# Patient Record
Sex: FEMALE | Race: ASIAN | NOT HISPANIC OR LATINO | Employment: UNEMPLOYED | URBAN - METROPOLITAN AREA
[De-identification: names, ages, dates, MRNs, and addresses within clinical notes are randomized per-mention and may not be internally consistent; named-entity substitution may affect disease eponyms.]

---

## 2020-10-27 ENCOUNTER — TRANSCRIBE ORDERS (OUTPATIENT)
Dept: LAB | Facility: HOSPITAL | Age: 35
End: 2020-10-27

## 2020-10-27 ENCOUNTER — LAB (OUTPATIENT)
Dept: LAB | Facility: HOSPITAL | Age: 35
End: 2020-10-27
Payer: COMMERCIAL

## 2020-10-27 DIAGNOSIS — R76.11 NONSPECIFIC REACTION TO TUBERCULIN TEST: ICD-10-CM

## 2020-10-27 DIAGNOSIS — R76.11 NONSPECIFIC REACTION TO TUBERCULIN TEST: Primary | ICD-10-CM

## 2020-10-27 PROCEDURE — 86480 TB TEST CELL IMMUN MEASURE: CPT

## 2020-10-27 PROCEDURE — 87591 N.GONORRHOEAE DNA AMP PROB: CPT

## 2020-10-27 PROCEDURE — 87491 CHLMYD TRACH DNA AMP PROBE: CPT

## 2020-10-27 PROCEDURE — 86592 SYPHILIS TEST NON-TREP QUAL: CPT

## 2020-10-27 PROCEDURE — 36415 COLL VENOUS BLD VENIPUNCTURE: CPT

## 2020-10-28 LAB
C TRACH DNA SPEC QL NAA+PROBE: NEGATIVE
N GONORRHOEA DNA SPEC QL NAA+PROBE: NEGATIVE
RPR SER QL: NORMAL

## 2020-10-29 LAB
GAMMA INTERFERON BACKGROUND BLD IA-ACNC: 0.03 IU/ML
M TB IFN-G BLD-IMP: NEGATIVE
M TB IFN-G CD4+ BCKGRND COR BLD-ACNC: 0.07 IU/ML
M TB IFN-G CD4+ BCKGRND COR BLD-ACNC: 0.08 IU/ML
MITOGEN IGNF BCKGRD COR BLD-ACNC: >10 IU/ML

## 2021-01-13 ENCOUNTER — ANNUAL EXAM (OUTPATIENT)
Dept: OBGYN CLINIC | Facility: CLINIC | Age: 36
End: 2021-01-13
Payer: COMMERCIAL

## 2021-01-13 VITALS
HEIGHT: 67 IN | SYSTOLIC BLOOD PRESSURE: 110 MMHG | DIASTOLIC BLOOD PRESSURE: 70 MMHG | WEIGHT: 145 LBS | BODY MASS INDEX: 22.76 KG/M2

## 2021-01-13 DIAGNOSIS — Z23 NEED FOR HPV VACCINE: ICD-10-CM

## 2021-01-13 DIAGNOSIS — Z30.09 ENCOUNTER FOR COUNSELING REGARDING CONTRACEPTION: ICD-10-CM

## 2021-01-13 DIAGNOSIS — N88.2 CERVICAL STENOSIS (UTERINE CERVIX): ICD-10-CM

## 2021-01-13 DIAGNOSIS — Z01.419 WOMEN'S ANNUAL ROUTINE GYNECOLOGICAL EXAMINATION: Primary | ICD-10-CM

## 2021-01-13 PROCEDURE — G0124 SCREEN C/V THIN LAYER BY MD: HCPCS | Performed by: PATHOLOGY

## 2021-01-13 PROCEDURE — 90651 9VHPV VACCINE 2/3 DOSE IM: CPT | Performed by: NURSE PRACTITIONER

## 2021-01-13 PROCEDURE — G0145 SCR C/V CYTO,THINLAYER,RESCR: HCPCS | Performed by: PATHOLOGY

## 2021-01-13 PROCEDURE — 87624 HPV HI-RISK TYP POOLED RSLT: CPT | Performed by: NURSE PRACTITIONER

## 2021-01-13 PROCEDURE — S0612 ANNUAL GYNECOLOGICAL EXAMINA: HCPCS | Performed by: NURSE PRACTITIONER

## 2021-01-13 PROCEDURE — 90471 IMMUNIZATION ADMIN: CPT | Performed by: NURSE PRACTITIONER

## 2021-01-13 RX ORDER — MISOPROSTOL 200 UG/1
TABLET ORAL
Qty: 3 TABLET | Refills: 0 | Status: SHIPPED | OUTPATIENT
Start: 2021-01-13 | End: 2021-06-09 | Stop reason: ALTCHOICE

## 2021-01-13 NOTE — PROGRESS NOTES
Subjective    HPI:     Jg Delaney is a 28 y o  female  She is a [place] 2, Para 0  Her menstrual cycles are regular and predictable, 28 days apart lasting 4-5 days  Her current method of contraception includes withdrawal  She is interest initiating birth control  Options of OCP and IUD reviewed  She is opted for a Mirena  She denies issues with intimacy  She denies /GI and Gyn complaints  She has a abnormal pap with ASCUS + HPV in 2019  A colposcopy was done and results were benign reactive squamous mucosa  No HPV infection or dysplasia  She feels safe at home  She complains of situational depression/anxiety  Medical, surgical and family history reviewed  Mother had uterine cancer and father had tongue cancer  Her dental care is up-to-date  She eats a healthy diet and exercises regularly  Gynecologic History    Patient's last menstrual period was 2021  Last Pap: 2019 at the [facility]  Results were ASCUS with + HPV  She started the HPV vaccine in 2019 received one dose  Needs to complete series  Obstetric History    OB History    Para Term  AB Living   2       2     SAB TAB Ectopic Multiple Live Births     2            # Outcome Date GA Lbr Bennett/2nd Weight Sex Delivery Anes PTL Lv   2 TAB            1 TAB                The following portions of the patient's history were reviewed and updated as appropriate: allergies, current medications, past family history, past medical history, past social history, past surgical history and problem list     Review of Systems    Pertinent items are noted in HPI  Objective    Physical Exam  Constitutional:       Appearance: She is well-developed  Genitourinary:      Pelvic exam was performed with patient in the lithotomy position  Vulva, inguinal canal, urethra, bladder, vagina, uterus, right adnexa and left adnexa normal       No posterior fourchette tenderness, injury, rash or lesion present        Cervix is nulliparous  No cervical motion tenderness, discharge, friability, lesion, erythema, bleeding, polyp or nabothian cyst       Uterus is anteverted  No right or left adnexal mass present  Right adnexa not tender or full  Left adnexa not tender or full  HENT:      Head: Normocephalic and atraumatic  Neck:      Musculoskeletal: Neck supple  Thyroid: No thyromegaly  Cardiovascular:      Rate and Rhythm: Normal rate and regular rhythm  Heart sounds: Normal heart sounds, S1 normal and S2 normal    Pulmonary:      Effort: Pulmonary effort is normal       Breath sounds: Normal breath sounds  Chest:      Breasts: Breasts are symmetrical          Right: Normal  No inverted nipple, mass, nipple discharge, skin change or tenderness  Left: Normal  No inverted nipple, mass, nipple discharge, skin change or tenderness  Abdominal:      General: Bowel sounds are normal  There is no distension  Palpations: Abdomen is soft  There is no mass  Tenderness: There is no abdominal tenderness  There is no guarding  Lymphadenopathy:      Cervical: No cervical adenopathy  Upper Body:      Right upper body: No supraclavicular or axillary adenopathy  Left upper body: No supraclavicular or axillary adenopathy  Neurological:      Mental Status: She is alert  Skin:     General: Skin is warm and dry  Findings: No rash  Psychiatric:         Attention and Perception: Attention and perception normal          Mood and Affect: Mood and affect normal          Speech: Speech normal          Behavior: Behavior is cooperative  Thought Content: Thought content normal          Cognition and Memory: Cognition and memory normal          Judgment: Judgment normal    Vitals signs and nursing note reviewed          Assessment and Plan    Allen Brooks was seen today for gynecologic exam     Diagnoses and all orders for this visit:    Women's annual routine gynecological examination    Need for HPV vaccine  -     HPV VACCINE 9 VALENT IM    Encounter for counseling regarding contraception    Cervical stenosis (uterine cervix)  -     misoprostol (CYTOTEC) 200 mcg tablet; Take 600 mg night before insertion of IUD  Patient informed of a Stable GYN exam  A pap smear was performed  I have discussed the importance of exercise and healthy diet as well as adequate intake of calcium and vitamin D  The current ASCCP guidelines were reviewed  The low risk patient will receive pap smear screening every 3 years until the age of 34 and then every 3 to 5 years with HPV co-testing from the ages of 33-67  I emphasized the importance of an annual pelvic and breast exam  A yearly mammogram is recommended for breast cancer screening starting at age 36  Cytotec prescribed for cervical ripening, to be take the night before insertion  She was also instructed to take 600 mg of Ibuprofen the night before and morning of insertion  As well as to ensure that she eats prior to her procedure  All questions have been answered to her satisfaction  Follow up in: 1 year

## 2021-01-16 LAB
HPV HR 12 DNA CVX QL NAA+PROBE: POSITIVE
HPV16 DNA CVX QL NAA+PROBE: NEGATIVE
HPV18 DNA CVX QL NAA+PROBE: NEGATIVE

## 2021-01-20 LAB
LAB AP GYN PRIMARY INTERPRETATION: ABNORMAL
Lab: ABNORMAL
PATH INTERP SPEC-IMP: ABNORMAL

## 2021-01-21 ENCOUNTER — TELEPHONE (OUTPATIENT)
Dept: OBGYN CLINIC | Facility: CLINIC | Age: 36
End: 2021-01-21

## 2021-01-26 ENCOUNTER — TELEPHONE (OUTPATIENT)
Dept: OBGYN CLINIC | Facility: CLINIC | Age: 36
End: 2021-01-26

## 2021-01-26 NOTE — TELEPHONE ENCOUNTER
IUD is covered per [employer] from [employer] at 100% no prior auth needed  Call ref # [id number]

## 2021-02-11 ENCOUNTER — PROCEDURE VISIT (OUTPATIENT)
Dept: OBGYN CLINIC | Facility: CLINIC | Age: 36
End: 2021-02-11
Payer: COMMERCIAL

## 2021-02-11 VITALS
DIASTOLIC BLOOD PRESSURE: 72 MMHG | HEIGHT: 67 IN | BODY MASS INDEX: 22.76 KG/M2 | WEIGHT: 145 LBS | SYSTOLIC BLOOD PRESSURE: 110 MMHG

## 2021-02-11 DIAGNOSIS — R87.610 ASCUS WITH POSITIVE HIGH RISK HPV CERVICAL: ICD-10-CM

## 2021-02-11 DIAGNOSIS — R87.810 ASCUS WITH POSITIVE HIGH RISK HPV CERVICAL: ICD-10-CM

## 2021-02-11 DIAGNOSIS — Z30.430 ENCOUNTER FOR IUD INSERTION: Primary | ICD-10-CM

## 2021-02-11 PROCEDURE — 58300 INSERT INTRAUTERINE DEVICE: CPT | Performed by: NURSE PRACTITIONER

## 2021-02-11 NOTE — PROGRESS NOTES
Blanca Christie 28year-old here for IUD insertion  Pap smear results reviewed and she is aware that she needs to schedule an appt for a colposcopy with Dr Katina Zepeda  Patient's last menstrual period was 02/03/2021  Iud insertions    Date/Time: 2/11/2021 11:45 AM  Performed by: SADA Parekh  Authorized by: SADA Parekh   Universal Protocol:  Consent: Verbal consent obtained  Consent given by: patient  Timeout called at: 2/11/2021 11:45 AM   Patient understanding: patient states understanding of the procedure being performed  Patient identity confirmed: verbally with patient        Procedure:     Pelvic exam performed: yes      Negative GC/chlamydia test: Low risk STD  Negative urine pregnancy test: LMP 2/3/21  Cervix cleaned and prepped: yes      Speculum placed in vagina: yes      Tenaculum applied to cervix: yes      Uterus sounded: yes      Uterus sound depth (cm):  8    IUD inserted with no complications: yes      IUD type:  Mirena    Strings trimmed: yes    Post-procedure:     Patient tolerated procedure well: yes      Patient will follow up after next period: yes    Comments:      IUD inserted without difficulty  Transabdominal US shows proper placement and no evidence of perforation  Patient given instruction booklet  She is to return in 5 weeks for follow up  Grzegorz Coe was seen today for procedure      Diagnoses and all orders for this visit:    Encounter for IUD insertion  -     levonorgestrel (MIRENA) IUD 20 mcg/day  -     Iud insertions    ASCUS with positive high risk HPV cervical

## 2021-03-03 ENCOUNTER — PROCEDURE VISIT (OUTPATIENT)
Dept: OBGYN CLINIC | Facility: CLINIC | Age: 36
End: 2021-03-03
Payer: COMMERCIAL

## 2021-03-03 VITALS
DIASTOLIC BLOOD PRESSURE: 60 MMHG | HEIGHT: 67 IN | BODY MASS INDEX: 23.54 KG/M2 | SYSTOLIC BLOOD PRESSURE: 90 MMHG | WEIGHT: 150 LBS

## 2021-03-03 DIAGNOSIS — R87.610 ATYPICAL SQUAMOUS CELLS OF UNDETERMINED SIGNIFICANCE ON CYTOLOGIC SMEAR OF CERVIX (ASC-US): Primary | ICD-10-CM

## 2021-03-03 PROCEDURE — 88305 TISSUE EXAM BY PATHOLOGIST: CPT | Performed by: PATHOLOGY

## 2021-03-03 PROCEDURE — 3008F BODY MASS INDEX DOCD: CPT | Performed by: OBSTETRICS & GYNECOLOGY

## 2021-03-03 PROCEDURE — 57454 BX/CURETT OF CERVIX W/SCOPE: CPT | Performed by: OBSTETRICS & GYNECOLOGY

## 2021-03-03 NOTE — PROGRESS NOTES
Colposcopy    Date/Time: 3/3/2021 12:26 PM  Performed by: Prosper English MD  Authorized by: Prosper English MD     Consent:     Consent obtained:  Verbal    Consent given by:  Patient    Procedural risks discussed:  Bleeding and infection    Patient questions answered: yes      Patient agrees, verbalizes understanding, and wants to proceed: yes      Educational handouts given: no      Instructions and paperwork completed: yes    Pre-procedure:     Pre-procedure timeout performed: yes      Prepped with: acetic acid    Indication:     Indications: Positive HPV  Procedure:     Procedure: Colposcopy w/ cervical biopsy and ECC      Under satisfactory analgesia the patient was prepped and draped in the dorsal lithotomy position: yes      [place] speculum was placed in the vagina: yes      Under colposcopic examination the transition zone was seen in entirety: yes      Endocervix was curetted using a Kevorkian curette: yes      Cervical biopsy performed with a cervical biopsy punch: yes      Tampon inserted: no      Monsel's solution was applied: yes      Biopsy(s): yes      Location:   8:00 a m , 9:00 a m , 12:00 p m  Aston Hong Specimen to pathology: yes    Post-procedure:     Findings: White epithelium    Comments: The patient tolerated procedure well  There were areas of possible white epithelium taking at the 8 9 and 12 o'clock position  Patient says she has has a history of colposcopy the past it was mild dysplasia  Right knee this of be the diagnosis again  She tolerated procedure well  The IUD string was seen  IUD was not disturbed  Bleeding was well controlled with Monsel's  Impression VINI 1  Arrange for virtual visit in 2 weeks  The patient has gotten 2 injections of the Gardasil will make arrangements for the 3rd

## 2021-03-17 ENCOUNTER — TELEMEDICINE (OUTPATIENT)
Dept: OBGYN CLINIC | Facility: CLINIC | Age: 36
End: 2021-03-17
Payer: COMMERCIAL

## 2021-03-17 DIAGNOSIS — R87.618 OTHER ABNORMAL CYTOLOGICAL FINDING OF SPECIMEN FROM CERVIX: Primary | ICD-10-CM

## 2021-03-17 PROCEDURE — 99213 OFFICE O/P EST LOW 20 MIN: CPT | Performed by: OBSTETRICS & GYNECOLOGY

## 2021-03-17 NOTE — PATIENT INSTRUCTIONS
The patient was informed of benign biopsy results  No further therapy needed at this time  Will repeat a Pap smear in 6 months  The next 2 years  She agrees this plan  All questions were answered

## 2021-03-17 NOTE — PROGRESS NOTES
Virtual Regular Visit    Virtual visit for discussion of colposcopy with a history of an abnormal Pap smear positive HPV  Assessment/Plan:    The patient was informed of benign results of colposcopy  Will make arrangements for repeat Pap smear every 6 months for the next 2 years  Problem List Items Addressed This Visit     None               Reason for visit is   Follow-up for colposcopy  Chief Complaint   Patient presents with    Virtual Regular Visit        Encounter provider Ana Luisa Miller MD    Provider located at [address] [place] [id number] [phone]      Recent Visits  No visits were found meeting these conditions  Showing recent visits within past 7 days and meeting all other requirements     Future Appointments  No visits were found meeting these conditions  Showing future appointments within next 150 days and meeting all other requirements        The patient was identified by name and date of birth  Monster Cristobal was informed that this is a telemedicine visit and that the visit is being conducted through [employer] and patient was informed that this is a secure, HIPAA-compliant platform  She agrees to proceed     My office door was closed  No one else was in the room  She acknowledged consent and understanding of privacy and security of the video platform  The patient has agreed to participate and understands they can discontinue the visit at any time  Patient is aware this is a billable service  Fadi Mayorga is a 28 y o  female     This is a 58-year-old female who now has set up a virtual visit for discussion of results of her recent colposcopy  HPI     This patient had an abnormal Pap smear that was positive for HPV  She has received the Gardasil vaccine  Colposcopy was done  Questionable areas of white epithelium  Biopsies returned negative with no evidence of any dysplasia    The ECC was also negative  This was explained to the patient  All questions were answered  They will be no therapy the present time  We will repeat a Pap smear every 6 months for the next 2 years  The patient agrees that plan and understands  Past Medical History:   Diagnosis Date    Abnormal Pap smear of cervix     HPV (human papilloma virus) infection        No past surgical history on file  Current Outpatient Medications   Medication Sig Dispense Refill    misoprostol (CYTOTEC) 200 mcg tablet Take 600 mg night before insertion of IUD  (Patient not taking: Reported on 3/3/2021) 3 tablet 0     No current facility-administered medications for this visit  No Known Allergies    Review of Systems    Negative  Video Exam    There were no vitals filed for this visit  Physical Exam    the patient is alert orient via the video without any evidence of acute distress  I spent approximately 4 minutes with this patient via the the video visit  VIRTUAL VISIT DISCLAIMER    Blanca Christie acknowledges that she has consented to an online visit or consultation  She understands that the online visit is based solely on information provided by her, and that, in the absence of a face-to-face physical evaluation by the physician, the diagnosis she receives is both limited and provisional in terms of accuracy and completeness  This is not intended to replace a full medical face-to-face evaluation by the physician  Blanca Chirstie understands and accepts these terms

## 2021-03-30 DIAGNOSIS — Z23 ENCOUNTER FOR IMMUNIZATION: ICD-10-CM

## 2021-04-08 ENCOUNTER — IMMUNIZATIONS (OUTPATIENT)
Dept: FAMILY MEDICINE CLINIC | Facility: HOSPITAL | Age: 36
End: 2021-04-08

## 2021-04-08 DIAGNOSIS — Z23 ENCOUNTER FOR IMMUNIZATION: Primary | ICD-10-CM

## 2021-04-08 PROCEDURE — 0001A SARS-COV-2 / COVID-19 MRNA VACCINE (PFIZER-BIONTECH) 30 MCG: CPT

## 2021-04-08 PROCEDURE — 91300 SARS-COV-2 / COVID-19 MRNA VACCINE (PFIZER-BIONTECH) 30 MCG: CPT

## 2021-05-02 ENCOUNTER — IMMUNIZATIONS (OUTPATIENT)
Dept: FAMILY MEDICINE CLINIC | Facility: HOSPITAL | Age: 36
End: 2021-05-02

## 2021-05-02 DIAGNOSIS — Z23 ENCOUNTER FOR IMMUNIZATION: Primary | ICD-10-CM

## 2021-05-02 PROCEDURE — 0002A SARS-COV-2 / COVID-19 MRNA VACCINE (PFIZER-BIONTECH) 30 MCG: CPT

## 2021-05-02 PROCEDURE — 91300 SARS-COV-2 / COVID-19 MRNA VACCINE (PFIZER-BIONTECH) 30 MCG: CPT

## 2021-06-09 ENCOUNTER — OFFICE VISIT (OUTPATIENT)
Dept: OBGYN CLINIC | Facility: CLINIC | Age: 36
End: 2021-06-09
Payer: COMMERCIAL

## 2021-06-09 VITALS
DIASTOLIC BLOOD PRESSURE: 62 MMHG | HEIGHT: 67 IN | SYSTOLIC BLOOD PRESSURE: 102 MMHG | BODY MASS INDEX: 22.76 KG/M2 | WEIGHT: 145 LBS

## 2021-06-09 DIAGNOSIS — N76.0 ACUTE VAGINITIS: Primary | ICD-10-CM

## 2021-06-09 PROCEDURE — 99213 OFFICE O/P EST LOW 20 MIN: CPT | Performed by: NURSE PRACTITIONER

## 2021-06-09 NOTE — PROGRESS NOTES
SUBJECTIVE:     28 y o  female complains of having vaginal itching which started 4 weeks ago  Initially she used monistat 3 which did not work  She has some discharge and continued itching  Two weeks after the initial treatment with monistat she treated again with monistat 7  Currently experiencing itchiness with scant discharge  She applying the monistat externally  Denies abnormal vaginal bleeding or significant pelvic pain or  fever  No UTI symptoms  Denies history of known exposure to STD  No LMP recorded  Patient has had an implant  OBJECTIVE:   She appears well, afebrile  Abdomen: benign, soft, nontender, no masses  Pelvic Exam: VULVA: normal appearing vulva with no masses, tenderness or lesions, VAGINA: vaginal discharge - moderate amount of white, odorless, thin, CERVIX: normal appearing cervix without discharge or lesions  IUD string noted  ASSESSMENT AND PLAN:     Kyrie Alfredo was seen today for vaginal itching  Diagnoses and all orders for this visit:    Acute vaginitis      Discontinue the external use of monistat  Apply Aquaphor external for itching relief  Will call with results

## 2021-06-13 LAB
A VAGINAE DNA VAG NAA+PROBE-LOG#: NOT DETECTED LOG CELLS/ML
C GLABRATA DNA VAG QL NAA+PROBE: NOT DETECTED
CANDIDA DNA VAG QL NAA+PROBE: NOT DETECTED
G VAGINALIS DNA VAG NAA+PROBE-LOG#: NOT DETECTED LOG CELLS/ML
LACTOBACILLUS DNA VAG NAA+PROBE-LOG#: 6.4 LOG (CELLS/ML)
MEGASPHAERA SP DNA VAG NAA+PROBE-LOG#: NOT DETECTED LOG CELLS/ML
SL AMB BV CATEGORY:: NORMAL
SL AMB C. PARAPSILOSIS, DNA: NOT DETECTED
SL AMB C. TROPICALIS, DNA: NOT DETECTED
T VAGINALIS RRNA SPEC QL NAA+PROBE: NOT DETECTED

## 2021-06-18 ENCOUNTER — OFFICE VISIT (OUTPATIENT)
Dept: OBGYN CLINIC | Facility: CLINIC | Age: 36
End: 2021-06-18
Payer: COMMERCIAL

## 2021-06-18 VITALS
SYSTOLIC BLOOD PRESSURE: 100 MMHG | WEIGHT: 145 LBS | DIASTOLIC BLOOD PRESSURE: 58 MMHG | BODY MASS INDEX: 22.76 KG/M2 | HEIGHT: 67 IN

## 2021-06-18 DIAGNOSIS — Z09 FOLLOW UP: Primary | ICD-10-CM

## 2021-06-18 PROCEDURE — 3008F BODY MASS INDEX DOCD: CPT | Performed by: NURSE PRACTITIONER

## 2021-06-18 PROCEDURE — 99212 OFFICE O/P EST SF 10 MIN: CPT | Performed by: NURSE PRACTITIONER

## 2021-06-18 NOTE — PATIENT INSTRUCTIONS
Bacterial Vaginosis   AMBULATORY CARE:   Bacterial vaginosis  is an infection in the vagina  It may cause vaginitis (irritation and inflammation of the vagina)  The cause is not known  Bacteria normally found in the vagina are imbalanced  Your risk increases if you are sexually active, you use a douche, or you have an intrauterine device (IUD)  Common signs and symptoms of bacterial vaginosis:   · White, gray, or yellow vaginal discharge    · Vaginal discharge that smells like fish    · Itching or burning around the outside of your vagina    Call your doctor or gynecologist if:   · Your symptoms come back or do not improve with treatment  · You have vaginal bleeding that is not your monthly period  · You have questions or concerns about your condition or care  Antibiotics  are given to kill the bacteria  They may be given as a pill or as a cream to put in your vagina  Bacterial vaginosis and pregnancy:  If you have bacterial vaginosis during pregnancy, your baby may be born early or have a low birth weight  Your healthcare provider may recommend testing for bacterial vaginosis before or during your pregnancy  He or she will talk to you about your risk for premature delivery, and make sure you know the benefits and risks of testing  Prevent bacterial vaginosis:   · Keep your vaginal area clean and dry  Wear underwear and pantyhose with a cotton crotch  Wipe from front to back after you urinate or have a bowel movement  After you bathe, rinse soap from your vaginal area to decrease your risk for irritation  · Do not use products that cause irritation  Always use unscented tampons or sanitary pads  Do not use feminine sprays, powders, or scented tampons  They may cause irritation and increase your risk for vaginosis  Detergents and fabric softeners may also cause irritation  · Do not use a douche  This can cause an imbalance in healthy vaginal bacteria  · Use latex condoms during sex    This helps prevent another infection and keeps your partner from getting the infection  Follow up with your doctor or gynecologist as directed: Bacterial vaginosis increases the risk for several health problems, such as pelvic inflammatory disease (PID) or sexually transmitted infections  Work with your healthcare providers to schedule regular appointments to check for health problems  Write down your questions so you remember to ask them during your visits  © Copyright [employer] 2021 Information is for End User's use only and may not be sold, redistributed or otherwise used for commercial purposes  All illustrations and images included in CareNotes® are the copyrighted property of A D A M , Inc  or [facility] [employer]   The above information is an  only  It is not intended as medical advice for individual conditions or treatments  Talk to your doctor, nurse or pharmacist before following any medical regimen to see if it is safe and effective for you  Yeast Infection   WHAT YOU NEED TO KNOW:   What is a yeast infection? A yeast infection, or vulvovaginal candidiasis, is a common vaginal infection  Vulvovaginal candidiasis is caused by a fungus, or yeast-like germ  Fungi are normally found in your vagina  Too many fungi can cause an infection  What increases my risk for a yeast infection? · Pregnancy    · Medicines, such as antibiotics, birth control pills, or steroid medicine    · Medical conditions, such as diabetes    · Contraceptive devices, such as diaphragms, sponges, and intrauterine devices    What are the signs and symptoms of a yeast infection? · Thick, white, cheese-like discharge from your vagina    · Itching, swelling, and redness in your vagina    · Burning when you urinate    How is a yeast infection diagnosed and treated? · Your healthcare provider will ask about your medical history and examine you   A sample of your vaginal discharge may show what germ is causing your infection  · Medicines help treat the fungal infection and decrease inflammation  The medicine may be a pill, cream, ointment, or vaginal tablet or suppository  With treatment, the infection is usually gone within a week  Keep your vagina healthy:   · Do not have sex until your symptoms go away  Have your partner wear a condom until you complete your course of medication  · Always wipe from front to back  after you use the toilet  This prevents spreading bacteria from your rectal area into your vagina  · Clean around your vulva with mild soap and warm water each day  Gently dry the area after washing  Do not use hot tubs  The heat and moisture from hot tubs can increase your risk for another yeast infection  · Do not wear tight-fitting clothes or undergarments  for long periods  Wear cotton underwear during the day  Cotton helps keep your genital area dry and does not hold in warmth or moisture  Do not wear underwear at night  · Change your laundry soap or fabric softener  if you think it is irritating your skin  · Do not douche  or use feminine hygiene sprays or bubble bath  Do not use pads or tampons that are scented, or colored or perfumed toilet paper  · Ask your healthcare provider about birth control options if necessary  Condoms have latex and diaphragms have gel that kills sperm  Both of these may irritate your genital area  When should I contact my healthcare provider? · You have fever and chills  · You develop abdominal or pelvic pain  · Your discharge is bloody and it is not your monthly period  · Your signs and symptoms get worse, even after treatment  · You have questions or concerns about your condition or care  CARE AGREEMENT:   You have the right to help plan your care  Learn about your health condition and how it may be treated  Discuss treatment options with your healthcare providers to decide what care you want to receive   You always have the right to refuse treatment  The above information is an  only  It is not intended as medical advice for individual conditions or treatments  Talk to your doctor, nurse or pharmacist before following any medical regimen to see if it is safe and effective for you  © Copyright [address] [place] [address] Information is for End User's use only and may not be sold, redistributed or otherwise used for commercial purposes   All illustrations and images included in CareNotes® are the copyrighted property of A D A M , Inc  or [address] [place] [address]

## 2021-06-18 NOTE — PROGRESS NOTES
Wm Myrick 28year-old presents to review vaginal culture results from last week which are normal  She also  wanted to discuss prevention of vaginal candida and BV, as well as safe oral sex  Reviewed things she can do to prevent candida such as ensure the vagina is clean and dry  Wearing cotton underwear  Written information provided in her AVS for both candida and BV  As for oral sex, we discussed ensuring that both are clean and free of any abnormal discharge  All questions and concerns addressed and patient verbalized understanding  Rodolfo Bernard was seen today for follow-up      Diagnoses and all orders for this visit:    Follow up      Follow-up as needed

## 2021-12-13 ENCOUNTER — IMMUNIZATIONS (OUTPATIENT)
Dept: FAMILY MEDICINE CLINIC | Facility: HOSPITAL | Age: 36
End: 2021-12-13

## 2021-12-13 DIAGNOSIS — Z23 ENCOUNTER FOR IMMUNIZATION: Primary | ICD-10-CM

## 2021-12-13 PROCEDURE — 91300 COVID-19 PFIZER VACC 0.3 ML: CPT

## 2021-12-13 PROCEDURE — 0001A COVID-19 PFIZER VACC 0.3 ML: CPT

## 2024-07-16 ENCOUNTER — ANNUAL EXAM (OUTPATIENT)
Dept: OBGYN CLINIC | Facility: CLINIC | Age: 39
End: 2024-07-16
Payer: COMMERCIAL

## 2024-07-16 VITALS — WEIGHT: 144 LBS | SYSTOLIC BLOOD PRESSURE: 118 MMHG | DIASTOLIC BLOOD PRESSURE: 82 MMHG | BODY MASS INDEX: 22.55 KG/M2

## 2024-07-16 DIAGNOSIS — Z01.419 WOMEN'S ANNUAL ROUTINE GYNECOLOGICAL EXAMINATION: Primary | ICD-10-CM

## 2024-07-16 PROCEDURE — S0612 ANNUAL GYNECOLOGICAL EXAMINA: HCPCS | Performed by: OBSTETRICS & GYNECOLOGY

## 2024-07-16 NOTE — PATIENT INSTRUCTIONS
Patient was informed of a stable GYN examination.  A Pap smear was performed.  IUD string was seen.  She is content with her weight.  There is no problem with intimacy.  She is contemplating pregnancy next year.  She will let us know when she wants IUD removed.  She will keep me informed.  We will review her Pap smear when it returns.

## 2024-07-16 NOTE — PROGRESS NOTES
Ambulatory Visit  Name: Simin York      : 1985      MRN: [id number]  Encounter Provider: Ryland Tafoya MD  Encounter Date: 2024   Encounter department: OB GYN [facility]    Assessment & Plan   1. Women's annual routine gynecological examination    She was informed of a stable GYN examination.  A Pap smear was performed.  The IUD string was seen.  She is content with her weight.  She feels safe at home.  She plans on starting a family next year when her  back healthy.  She is a history of abnormal Pap smear in the past.  Will watch for results of the Pap smear.  She denies any prior depression or anxiety.  She sees a dentist on a regular basis.  There are no new major family illnesses to report she is an only child.    History of Present Illness     Simin York is a 38 y.o. female who presents for yearly GYN examination.  She was last seen in the office over 3 years ago.  She has a Mirena IUD inserted in .  She is this is good to year .  She expresses that she may consider pregnancy next year or probably have the IUD removed at the end of this year or beginning of next year.  She has been very happy with it.  She denies any other major gynecological issues.  She does have a history I have abnormal Pap smear being HPV positive.  She has not had a Pap smear in over 3 years she will need a Pap smear today.  She is content with her weight.  She feels safe at home.  She sees a dentist on a regular basis.  Denies any problem depression.  She is gainfully employed as a .  Of note her  was recently diagnosed with a pituitary adenoma she had part of pituitary resected via transsphenoidal approach.  Things were successful surgery.  He presented with vision changes.  Patient denies any other major family illnesses.  She is content with her weight.    Review of Systems   All other systems reviewed and are negative.      Objective     /82   Wt 65.3 kg (144  lb)   BMI 22.55 kg/m²     Physical Exam  Vitals reviewed. Exam conducted with a chaperone present.   Constitutional:       Appearance: Normal appearance. She is normal weight.   HENT:      Head: Normocephalic and atraumatic.      Nose: Nose normal.   Eyes:      Extraocular Movements: Extraocular movements intact.      Pupils: Pupils are equal, round, and reactive to light.   Cardiovascular:      Rate and Rhythm: Normal rate and regular rhythm.      Pulses: Normal pulses.      Heart sounds: Normal heart sounds.   Pulmonary:      Effort: Pulmonary effort is normal.      Breath sounds: Normal breath sounds.   Chest:   Breasts:     Breasts are symmetrical.      Right: Normal. No swelling, bleeding, inverted nipple, mass or nipple discharge.      Left: Normal. No swelling, bleeding, inverted nipple, mass or nipple discharge.   Abdominal:      General: Abdomen is flat. Bowel sounds are normal. There is no distension.      Palpations: Abdomen is soft. There is no hepatomegaly, splenomegaly or mass.      Tenderness: There is no abdominal tenderness.      Hernia: No hernia is present. There is no hernia in the umbilical area, ventral area, left inguinal area or right inguinal area.   Genitourinary:     General: Normal vulva.      Labia:         Right: No rash, tenderness, lesion or injury.         Left: No rash, tenderness, lesion or injury.       Urethra: No prolapse, urethral pain, urethral swelling or urethral lesion.      Vagina: Normal. No signs of injury and foreign body. No vaginal discharge, erythema, tenderness, bleeding, lesions or prolapsed vaginal walls.      Cervix: Normal.      Uterus: Normal. Not deviated, not enlarged, not fixed, not tender and no uterine prolapse.       Adnexa: Right adnexa normal and left adnexa normal.        Right: No mass, tenderness or fullness.          Left: No mass, tenderness or fullness.        Rectum: Normal.      Comments: The external genitalia normal limits the vagina is clean  the cervix is closed IUD string was seen.  Uterus is anterior normal size.  The adnexa clear bilaterally.  There is no evidence of prolapse.  A Pap smear was performed.  Musculoskeletal:         General: Normal range of motion.      Cervical back: Normal range of motion.   Skin:     General: Skin is warm.   Neurological:      General: No focal deficit present.      Mental Status: She is alert and oriented to person, place, and time.   Psychiatric:         Mood and Affect: Mood normal.         Behavior: Behavior normal.       Administrative Statements

## 2024-07-18 LAB
CYTO CVX: ABNORMAL
CYTOLOGY CMNT CVX/VAG CYTO-IMP: ABNORMAL
GEN CATEG CVX/VAG CYTO-IMP: ABNORMAL

## 2024-08-26 ENCOUNTER — TELEPHONE (OUTPATIENT)
Age: 39
End: 2024-08-26

## 2024-08-26 NOTE — TELEPHONE ENCOUNTER
Contacted patient, gave CPT code for procedure she could contact insurance company for cost estimate.  Supplied [facility] website and billing number to obtain estimate for laboratory services.  Unable to to supply those codes to patient since not billed directly from the office. Instructed patient to call with any additional questions.

## 2024-08-26 NOTE — TELEPHONE ENCOUNTER
Patient calling with billing questions regarding her upcoming appt for a colposcopy on 08/29/24 with . warm transfer to office and was placed on hold, disconnected. Advised patient to call her insurance and ask how much it would be out of pocket for appt and any labs that would need to be done. Patient states she had this done about 2 years ago and received a bill for labs. Patient agrees and will call her insurance company but would also like a call back.

## 2024-08-29 ENCOUNTER — PROCEDURE VISIT (OUTPATIENT)
Dept: OBGYN CLINIC | Facility: CLINIC | Age: 39
End: 2024-08-29
Payer: COMMERCIAL

## 2024-08-29 VITALS — DIASTOLIC BLOOD PRESSURE: 76 MMHG | SYSTOLIC BLOOD PRESSURE: 124 MMHG | BODY MASS INDEX: 22.62 KG/M2 | WEIGHT: 144.4 LBS

## 2024-08-29 DIAGNOSIS — R87.612 LOW GRADE SQUAMOUS INTRAEPITHELIAL LESION ON CYTOLOGIC SMEAR OF CERVIX (LGSIL): Primary | ICD-10-CM

## 2024-08-29 PROCEDURE — 57454 BX/CURETT OF CERVIX W/SCOPE: CPT | Performed by: OBSTETRICS & GYNECOLOGY

## 2024-08-29 NOTE — PATIENT INSTRUCTIONS
The patient had a colposcopy for an abnormal Pap smear consistent with VINI-1.  Biopsies were taken at the 12 and somewhat position.  Impression is probably VINI-1.  Bleeding was well-controlled Monsel solution.  We will arrange for virtual visit in 2 weeks to discuss results and therapy if needed.  She will also call her fever chills excessive vaginal bleeding.

## 2024-08-29 NOTE — PROGRESS NOTES
Colposcopy    Date/Time: 8/29/2024 10:20 AM    Performed by: Ryland Tafoya MD  Authorized by: Ryland Tafoya MD    Other Assisting Provider: No    Verbal consent obtained?: Yes    Risks and benefits: Risks, benefits and alternatives were discussed    Consent given by:  Patient  Time Out:     Time out: Immediately prior to the procedure a time out was called      Time out performed at:  8/29/2024 10:20 AM  Patient states understanding of procedure being performed: Yes    Patient's understanding of procedure matches consent: Yes    Procedure consent matches procedure scheduled: Yes    Relevant documents present and verified: Yes    Test results available and properly labeled: Yes    Site marked: No    Radiology Images displayed and confirmed. If images not available, report reviewed: No    Patient identity confirmed:  Verbally with patient  Pre-procedure:     Prepped with: acetic acid    Indication:     Indication:  LSIL  Procedure:     Procedure: Colposcopy w/ cervical biopsy and ECC      Under satisfactory analgesia the patient was prepped and draped in the dorsal lithotomy position: yes      [place] speculum was placed in the vagina: yes      Cervix was cleansed with betadine: no      Under colposcopic examination the transition zone was seen in entirety: yes      Intracervical block was performed: no      Endocervix was curetted using a Kevorkian curette: yes      Cervical biopsy performed with a cervical biopsy punch: yes      Monsel's solution was applied: yes      Allis/Tenaculum removed and cervix homostatic: no      Specimen(s) to pathology: yes    Post-procedure:     Findings: White epithelium      Impression: Low grade cervical dysplasia      Patient tolerance of procedure:  Tolerated well, no immediate complications  Comments:      This is a 38-year-old female who had a history of ASCUS positive HPV back in 2021.  Colposcopy showed no evidence of dysplasia.  Her most recent Pap smear showed VINI-1.   Colposcopy today shows a white epithelium.  Biopsies were taken at the 12:00 in some o'clock position for white epithelium.  The bleeding was controlled Monsel's.  Will arrange for virtual visit in 2 weeks patient was informed my impression probably VINI-1.

## 2024-09-03 LAB
CLINICAL INFO: NORMAL
PATH REPORT.FINAL DX SPEC: NORMAL
PATHOLOGIST NAME: NORMAL
SPECIMEN SOURCE: NORMAL

## 2024-09-17 ENCOUNTER — TELEMEDICINE (OUTPATIENT)
Dept: OBGYN CLINIC | Facility: CLINIC | Age: 39
End: 2024-09-17
Payer: COMMERCIAL

## 2024-09-17 ENCOUNTER — TELEPHONE (OUTPATIENT)
Dept: OBGYN CLINIC | Facility: CLINIC | Age: 39
End: 2024-09-17

## 2024-09-17 DIAGNOSIS — R87.612 LOW GRADE SQUAMOUS INTRAEPITHELIAL LESION ON CYTOLOGIC SMEAR OF CERVIX (LGSIL): Primary | ICD-10-CM

## 2024-09-17 PROCEDURE — 99213 OFFICE O/P EST LOW 20 MIN: CPT | Performed by: OBSTETRICS & GYNECOLOGY

## 2024-09-17 NOTE — PATIENT INSTRUCTIONS
The patient was informed that her biopsies from her colposcopy were benign without any evidence of dysplasia.  We will continue doing yearly Pap smears all questions were answered.  She return to my office for yearly exam as scheduled.

## 2024-09-17 NOTE — PROGRESS NOTES
Virtual Regular Visit  Name: Simin York      : 1985      MRN: [id number]  Encounter Provider: Ryland Tafoya MD  Encounter Date: 2024   Encounter department: OB GYN [facility]    Verification of patient location:    Patient is located at Other in the following state in which I hold an active license [place]    Assessment & Plan  Low grade squamous intraepithelial lesion on cytologic smear of cervix (LGSIL)         The patient was informed the results of the biopsies.  The biopsies failed to show any dysplasia.  Questionable cervicitis.  Otherwise benign.  We will now continue her yearly Pap smears.  If things change or other questions she will let us know.      Encounter provider Ryland Tafoya MD    The patient was identified by name and date of birth. Simin Yrok was informed that this is a telemedicine visit and that the visit is being conducted through the [employer] platform. She agrees to proceed..  My office door was closed. No one else was in the room.  She acknowledged consent and understanding of privacy and security of the video platform. The patient has agreed to participate and understands they can discontinue the visit at any time.    Patient is aware this is a billable service.     History of Present Illness     Simin York is a 38 y.o. female who presents for virtual visit to discuss results of recent colposcopy.  The patient's most recent Pap smear was positive for possible VINI-1 or low-grade dysplasia.  She underwent a colposcopy.  She started BCG well.  Today we discussed the results of the pathology.    Review of Systems   All other systems reviewed and are negative.        Objective     There were no vitals taken for this visit.  Physical Exam   the patient was alert and oriented doing a virtual visit.  She is in no acute distress.  She says she tolerated procedure well.    Visit Time  Total Visit Duration: 3 minutes

## 2024-09-17 NOTE — TELEPHONE ENCOUNTER
Lvm for patient to call office to schedule annual exam July 2025 as noted in today's virtual visit summary. Also advised patient to verify/update insurance information.

## 2025-02-24 ENCOUNTER — PROCEDURE VISIT (OUTPATIENT)
Dept: OBGYN CLINIC | Facility: CLINIC | Age: 40
End: 2025-02-24
Payer: COMMERCIAL

## 2025-02-24 VITALS — BODY MASS INDEX: 22.55 KG/M2 | SYSTOLIC BLOOD PRESSURE: 124 MMHG | DIASTOLIC BLOOD PRESSURE: 70 MMHG | WEIGHT: 144 LBS

## 2025-02-24 DIAGNOSIS — Z30.432 ENCOUNTER FOR IUD REMOVAL: Primary | ICD-10-CM

## 2025-02-24 PROCEDURE — 58301 REMOVE INTRAUTERINE DEVICE: CPT | Performed by: OBSTETRICS & GYNECOLOGY

## 2025-02-24 NOTE — PATIENT INSTRUCTIONS
PHYSICAL THERAPY - DAILY TREATMENT NOTE    Patient Name: Bashir Croft        Date: 3/16/2022  : 1943   yes Patient  Verified  Visit #:   4   of   10  Insurance: Payor: Micheline Rees / Plan: [place] & B / Product Type: Medicare /      In time: 2:45 Out time: 3:25   Total Treatment Time: 40     Medicare/BCBS Time Tracking (below)   Total Timed Codes (min):  40 1:1 Treatment Time: 40     TREATMENT AREA =  Left shoulder pain [M25.512]    SUBJECTIVE  Pain Level (on 0 to 10 scale):  1-2  / 10   Medication Changes/New allergies or changes in medical history, any new surgeries or procedures?    no  If yes, update Summary List   Subjective Functional Status/Changes:  []  No changes reported     Doing ok considering I dont get as much time to do your HEP as much as I like. Sissy noticed the neck seems looser. Shoulder rolls seems to help. No Locus for the pain in the upper left shoulder - not pain more of an ache.           OBJECTIVE  Modalities Rationale:     decrease pain and increase tissue extensibility to improve patient's ability to perform ADLs   min [] Estim, type/location:                                      []  att     []  unatt     []  w/US     []  w/ice    []  w/heat    min []  Mechanical Traction: type/lbs                   []  pro   []  sup   []  int   []  cont    []  before manual    []  after manual    min []  Ultrasound, settings/location:      min []  Iontophoresis w/ dexamethasone, location:                                               []  take home patch       []  in clinic   NT min []  Ice     []  Heat    location/position: Neck supine with wedge    min []  Vasopneumatic Device, press/temp:     min []  Other:    [x] Skin assessment post-treatment (if applicable):    [x]  intact    []  redness- no adverse reaction     []redness  adverse reaction:        36 min Therapeutic Exercise:  [x]  See flow sheet   Rationale:      increase ROM and increase strength to improve the patients The IUD is removed for difficulty was shown to the patient.  She now desires fertility.  She will be followed by Dr. Daisha Daniels until she gets further along.  She will keep me informed of her progress.   ability to perform ADLs     14 min Manual Therapy: STM to L>R C/S paraspinals, upper trapezius, levator scapula, and parascapular mms, prone upper to mid T/S CPA/TPA mobs   Rationale:     decrease pain, increase ROM, increase tissue extensibility, decrease trigger points and increase postural awareness to improve patient's ability to perform household chores  The manual therapy interventions were performed at a separate and distinct time from the therapeutic activities interventions. Billed With/As:   [x] TE   [] TA   [] Neuro   [] Self Care Patient Education: [x] Review HEP    [x] Progressed/Changed HEP based on:   [] positioning   [x] body mechanics   [] transfers   [] heat/ice application    [] other:      Other Objective/Functional Measures:    Progressed T/S rotation to S/L - required cuing for proper form  Denies L upper arm pain with prone scap retraction  Decr mid T/S CPA mobility and notes L UE sx with upper T/S mobes  L shoulder pain with prone lying   Added Lat pull      Post Treatment Pain Level (on 0 to 10) scale:   1  / 10     ASSESSMENT  Assessment/Changes in Function:     Improving tolerance for parascapular strengthening therex - frequent cues for activity modification to tolerance. Improvements in C/S rotation. L shoulder protraction repeatedly produces L shoulder pain. []  See Progress Note/Recertification   Patient will continue to benefit from skilled PT services to modify and progress therapeutic interventions, address functional mobility deficits, address ROM deficits, address strength deficits, analyze and address soft tissue restrictions, analyze and cue movement patterns, analyze and modify body mechanics/ergonomics, assess and modify postural abnormalities and instruct in home and community integration to attain remaining goals. Progress toward goals / Updated goals:    1. Pt performing HEP. Goal in progress - Reports intermittent compliance with HEP (3/16/2022)  2.   Patient will report decreased c/o pain to < or = 7/10 to facilitate improved quality of sleep with manageable sx in the left shoulder.  Goal in progress 4/10 pain at the worst (3/14/2022)       PLAN  [x]  Upgrade activities as tolerated yes Continue plan of care   []  Discharge due to :    []  Other:      Therapist: Yina Sepulveda    Date: 3/16/2022 Time: 2:01 PM     Future Appointments   Date Time Provider Lisset Chawla   3/16/2022  2:45 PM [facility]   3/21/2022  3:45 PM [facility]   3/23/2022 11:15 AM [facility]   3/28/2022  3:45 PM [facility]   3/30/2022  2:45 PM Leidy Reece [facility]   4/4/2022  2:00 PM [facility]   4/6/2022  2:00 PM [facility]   1/11/2023 10:30 AM  [facility] [address]   1/11/2023 11:00 AM David Ivy

## 2025-02-24 NOTE — PROGRESS NOTES
IUD Procedure    Date/Time: 2/24/2025 12:51 PM    Performed by: Ryland Tafoya MD  Authorized by: Ryland Tafoya MD    Time Out:     Time out performed at:  2/24/2025 12:51 PM  Patient states understanding of procedure being performed: Yes    Relevant documents present and verified: Yes    Select procedure: IUD removal    IUD Removal:     Reason for removal: desire for fertility      Removed without complications: yes      Tenaculum/Allis/Ring Forceps applied to cervix: yes      Strings visualized: yes      IUD intact: yes      Performed with ultrasound guidance: no    Removal Comments:       IUD was removed for difficulty, it was shown to the patient prior to disposal.  Indication for removal was desire to start a family.  She has been evaluated by Dr. Sumaya Daniels.  She will keep me informed her progress.

## 2025-06-24 ENCOUNTER — TELEPHONE (OUTPATIENT)
Age: 40
End: 2025-06-24

## 2025-06-24 NOTE — TELEPHONE ENCOUNTER
Current  pt at [facility] needing to transfer for OB care was recommended [facility]. LMP 4/28/25 pt is 8.1 weeks along. No available appts for D&V please call pt to schedule her she is also asking if labs can be done to confirm.

## 2025-07-03 ENCOUNTER — ULTRASOUND (OUTPATIENT)
Dept: OBGYN CLINIC | Facility: CLINIC | Age: 40
End: 2025-07-03
Payer: COMMERCIAL

## 2025-07-03 VITALS
HEIGHT: 67 IN | WEIGHT: 148 LBS | BODY MASS INDEX: 23.23 KG/M2 | SYSTOLIC BLOOD PRESSURE: 96 MMHG | DIASTOLIC BLOOD PRESSURE: 58 MMHG

## 2025-07-03 DIAGNOSIS — N89.8 VAGINAL DISCHARGE: ICD-10-CM

## 2025-07-03 DIAGNOSIS — Z32.01 PREGNANCY, CONFIRMED, NOT FIRST: Primary | ICD-10-CM

## 2025-07-03 PROCEDURE — 87510 GARDNER VAG DNA DIR PROBE: CPT

## 2025-07-03 PROCEDURE — 76817 TRANSVAGINAL US OBSTETRIC: CPT

## 2025-07-03 PROCEDURE — 99213 OFFICE O/P EST LOW 20 MIN: CPT

## 2025-07-03 PROCEDURE — 87660 TRICHOMONAS VAGIN DIR PROBE: CPT

## 2025-07-03 PROCEDURE — 87480 CANDIDA DNA DIR PROBE: CPT

## 2025-07-03 NOTE — PROGRESS NOTES
"Assessment/Plan:  Diagnoses and all orders for this visit:    Pregnancy, confirmed, not first  -     AMB US OB < 14 weeks single or first gestation level 1  -     Ambulatory Referral to Maternal Fetal Medicine; Future    Vaginal discharge  -     VAGINOSIS DNA PROBE    Other orders  -     Prenatal MV-Min-Fe Fum-FA-DHA (PRENATAL 1 PO); Take 1 tablet by mouth in the morning        - Viable IUP @ 11w 0d EGA  - RONNIE 2026  - Continue PNV  - Patient to call for concerns  - RTO ~ 10-12 weeks for OB intake  - call MFM for 13 week NT scan/genetic testing.     Subjective:       Patient ID: Simin York 1985      Simin York is a 39 y.o.  presenting to the office for pregnancy confirmation. Patient's last menstrual period was 2025 (approximate). , placing her at 9w5d today with RONNIE of 2026. She is feeling well.      She c/o increased vaginal discharge and vaginal/vulvar itching x 2 days. She denies new soaps/detergents. She has not tried anything to help with symptoms. Denies vaginal odor/bleeding/pelvic pain. She does admit to feeling bloated.     Regular periods:yes  Nausea:yes  Vomiting: no  Bleeding: no  Cramping: no  Headaches: no  Fatigue: no  Constipation: no  Blood type, if known: unknown       OB History    Para Term  AB Living   3    2    SAB IAB Ectopic Multiple Live Births    2         # Outcome Date GA Lbr Bennett/2nd Weight Sex Type Anes PTL Lv   3 Current            2 IAB            1 IAB                  The following portions of the patient's history were reviewed and updated as appropriate: allergies, current medications, past family history, past medical history, past social history, past surgical history, and problem list.    Allergies:  Patient has no known allergies.    Medications:  Current Medications[1]    Objective:       Visit Vitals  BP 96/58   Ht 5' 7\" (1.702 m)   Wt 67.1 kg (148 lb)   LMP 2025 (Approximate)   BMI 23.18 kg/m²   OB Status Pregnant   Smoking " Status Never   BSA 1.78 m²     GEN: The patient was alert and oriented x3, pleasant well-appearing female in no acute distress.   PULM: nonlabored respirations  MSK: Normal gait  : WNL  Skin: warm, dry  Neuro: no focal deficits  Psych: normal affect and judgement, cooperative    Ultrasound:     Viability US     Gestational sac: present               Location: intrauterine  Yolk sac: present  Fetal pole: present               CRL: 4.3 cm = 11w0d  Cardiac activity: present               Rate: 163 bpm     Ovaries: normal appearing bilaterally  Cul de sac: absence of free fluid  Uterus: normal in appearance           Ultrasound Probe Disinfection    A transvaginal ultrasound was performed.   Prior to use, disinfection was performed with High Level Disinfection Process ([employer])  Probe serial number [id number]: [id number] was used    Marika Vu PA-C  07/03/25  3:16 PM          [1]   Current Outpatient Medications:     Prenatal MV-Min-Fe Fum-FA-DHA (PRENATAL 1 PO), Take 1 tablet by mouth in the morning, Disp: , Rfl:

## 2025-07-04 LAB
CANDIDA RRNA VAG QL PROBE: DETECTED
G VAGINALIS RRNA GENITAL QL PROBE: NOT DETECTED
T VAGINALIS RRNA GENITAL QL PROBE: NOT DETECTED

## 2025-07-07 ENCOUNTER — INITIAL PRENATAL (OUTPATIENT)
Dept: OBGYN CLINIC | Facility: CLINIC | Age: 40
End: 2025-07-07

## 2025-07-07 VITALS
DIASTOLIC BLOOD PRESSURE: 64 MMHG | WEIGHT: 149.8 LBS | BODY MASS INDEX: 23.51 KG/M2 | SYSTOLIC BLOOD PRESSURE: 98 MMHG | HEIGHT: 67 IN

## 2025-07-07 DIAGNOSIS — Z36.9 ENCOUNTER FOR ANTENATAL SCREENING: Primary | ICD-10-CM

## 2025-07-07 DIAGNOSIS — Z34.01 ENCOUNTER FOR SUPERVISION OF NORMAL FIRST PREGNANCY IN FIRST TRIMESTER: ICD-10-CM

## 2025-07-07 DIAGNOSIS — O09.511 PRIMIGRAVIDA OF ADVANCED MATERNAL AGE IN FIRST TRIMESTER: ICD-10-CM

## 2025-07-07 DIAGNOSIS — Z31.430 ENCOUNTER OF FEMALE FOR TESTING FOR GENETIC DISEASE CARRIER STATUS FOR PROCREATIVE MANAGEMENT: ICD-10-CM

## 2025-07-07 PROCEDURE — OBC

## 2025-07-07 NOTE — PROGRESS NOTES
.  OB INTAKE INTERVIEW  Patient is 39 y.o. who presents for OB intake at 11.4 wks  She is accompanied by  spouse  during this encounter  The father of her baby (Abhilash ) is involved in the pregnancy and is 37 years old.      Last Menstrual Period: 2025  Ultrasound: Measured 11 weeks 0 days on 2025  Estimated Date of Delivery: 2026  confirmed by  11.0 week US    Signs/Symptoms of Pregnancy  Current pregnancy symptoms: bloating, heaviness in chest, burning sensation in vaginal )on monistat  Constipation YES  Headaches no  Cramping/spotting no  PICA cravings no    Diabetes-  Body mass index is 23.46 kg/m².  If patient has 1 or more, please order early 1 hour GTT  History of GDM no  BMI >35 no  History of PCOS or current metformin use (should stop for 7 days prior to 1hr GTT unless pre-existing diabetes)  no  History of LGA/macrosomic infant (4000g/9lbs) no    If patient has 2 or more, please order early 1 hour GTT  BMI>30 no  AMA YES  First degree relative with type 2 diabetes yes, mom   History of chronic HTN, hyperlipidemia, elevated A1C no  High risk race (, , ,  or ) YES pt      Hypertension- if you answer yes to any of the following, please order baseline preeclampsia labs (cbc, comprehensive metabolic panel, urine protein creatinine ratio, uric acid)  History of of chronic HTN no  History of gestational HTN no  History of preeclampsia, eclampsia, or HELLP syndrome no  History of diabetes no  History of lupus,sjogrens syndrome, kidney disease no    Thyroid- if yes order TSH with reflex T4  History of thyroid disease no    Bleeding Disorder or Hx of DVT-patient or first degree relative with history of. Order the following if not done previously.   (Factor V, antithrombin III, prothrombin gene mutation, protein C and S Ag, lupus anticoagulant, anticardiolipin, beta-2 glycoprotein)   no    OB/GYN-  History of abnormal pap smear YES        Date of last pap smear 2024  History of HPV YES  History of Herpes/HSV no  History of other STI (gonorrhea, chlamydia, trich) no  History of prior  no  History of prior  no  History of  delivery prior to 36 weeks 6 days no  History of Varicella or Vaccination  Had pox as a child   History of blood transfusion no  Ok for blood transfusion  yes    Substance screening-   History of tobacco use no  Currently using tobacco no  Substance Use Screen Level  N/A     MRSA Screening-   Does the pt have a hx of MRSA? no    Immunizations:  Influenza vaccine given this season no  Discussed Tdap vaccine yes  Discussed COVID Vaccine yes    Genetic/M-  Do you or your partner have a history of any of the following in yourselves or first degree relatives?  Cystic fibrosis no  Spinal muscular atrophy no  Hemoglobinopathy/Sickle Cell/Thalassemia no  Fragile X Intellectual Disability no       discussed Carrier Screening being completed once in a lifetime as a standard of care lab test.  Ordered     Appointment for Nuchal Translucency Ultrasound at [facility] scheduled for , Not yet       Interview education  [facility] Pregnancy Essentials Book reviewed, discussed and attached to their AVS yes    Nurse/Family Partnership- patient may qualify no referral placed  no    Prenatal lab work scripts  yes  Extra labs ordered:   1 hr gtt, varicella, HGB cascade, CF,SMA    Aspirin/Preeclampsia Screen    Risk Level Risk Factor Recommendation   LOW Prior Uncomplicated full-term delivery no No Aspirin recommendation        MODERATE Nulliparity YES Recommend low-dose aspirin if     BMI>30 no 2 or more moderate risk factors    Family History Preeclampsia (mother/sister) no     35yr old or greater YES     Black Race, Concern for SDOH/Low Socioeconomic no     IVF Pregnancy  no     Personal History Risks (low birth weight, prior adverse preg outcome, >10yr preg interval) no         HIGH History of Preeclampsia no Recommend  low-dose aspirin if     Multifetal gestation no 1 or more high risk factors    Chronic HTN no     Type 1 or 2 Diabetes no     Renal Disease no     Autoimmune Disease  no      Contraindications to ASA therapy:  NSAID/ ASA allergy: no  Nasal polyps: no  Asthma with history of ASA induced bronchospasm: no  Relative contraindications:  History of GI bleed: no  Active peptic ulcer disease: no  Severe hepatic dysfunction: no    Patient should be recommended to take ASA 162mg during this pregnancy from 12-36wks to lower her risk of preeclampsia:  discussed           The patient has a history now or in prior pregnancy notable for:   AMA      Details that I feel the provider should be aware of: .This is a planned and welcomed pregnancy for parents. Patient is feeling generally well. Patient is experiencing some  bloating, constipation, and chest heaviness and burning sensation in vagina(currently using Monistat OTC medications and diet were discussed.Patient is 39 years old (AMA). She has a past medical history of an abnormal pap smear Positive HON and had a colposcopy 07/16/2024. Patient had 2 prior IAB's , Patient  Is of  descent  a HGB [place] was ordered. Her mom has Type II diabetes, endometriosis, and uterine cancer at age 53. Her father had tongue at age 60. Her  has a history of    pituatary adenoma(adenoma removed 2024) fob ankolysis spondilits ,anteriour uvitis hx , hydrocorisone dependent, hypothyroid , hypogonadism.   Patient knows to call OB for symptoms and how to contact her nurse navigator. Patient was made aware to have lab orders completed before next appointment. Patient denies any questions at this point and verbalizes understanding.PN1 visit scheduled. The patient was oriented to our practice, the navigator role, reviewed delivering physicians and [facility] for Delivery. All questions were answered.    Interviewed by: Electronically Signed by Farzaneh Tamez LPN ,Ob nurse navigator

## 2025-07-07 NOTE — PATIENT INSTRUCTIONS
.Congratulations!! Please review our Pregnancy Essential Guide and [facility] L&D Virtual tour from our networks website.     [facility] Pregnancy Essentials Guide  [facility] ([email or web address])     [facility] ([employer])

## 2025-07-18 PROBLEM — O09.521 ELDERLY MULTIGRAVIDA, FIRST TRIMESTER: Status: ACTIVE | Noted: 2025-07-18

## 2025-07-18 NOTE — PROGRESS NOTES
CONSULTATION: MATERNAL-FETAL MEDICINE  Name: Simin York      : 1985      MRN: [id number]  Encounter Provider:  US Dejah BARAJAS  Encounter Date: 2025   Encounter department: [facility]  :  Assessment & Plan  13 weeks gestation of pregnancy       Increased risk for preeclampsia   - Age   - History of nulliparity   - Recommend daily low-dose aspirin preeclampsia prophylaxis; continue until 36 weeks gestation  Elderly multigravida, first trimester       Normal late first trimester MFM nuchal translucency and detailed fetal anatomy evaluation    Today's ultrasound findings and suggested follow-up were discussed in detail with Simin.  At age 40 at her estimated due date, her risk for a liveborn baby at term with Down syndrome is 1 in 98, with a risk for all chromosomal abnormalities of 1 in 63.  We discussed that definitive prenatal diagnosis is possible only with genetic amniocentesis or chorionic villus sampling and discussed the small procedure related risk for pregnancy loss in each case.  We discussed the option of genetic screening using cell free DNA analysis which is not diagnostic, but which has sensitivities for identification of Down syndrome, trisomy 13, trisomy 18, and sex chromosome aneuploidies of 99.9%.   Simin opted for genetic screening using cell free DNA analysis.  MSAFP screening is recommended between 16 and 20 weeks gestation.  She will return for detailed MFM fetal anatomy evaluation at about 20 weeks gestation.    History of Present Illness     Simin is a 39 y.o.  at 13w1d presenting for consultation for advanced maternal age.  With the exception of bloating and fatigue, she has no complaints.  She denies nausea and vomiting and vaginal bleeding.  A prenatal office note of  was reviewed prior to the MFM encounter.    Simin has a history of 2 therapeutic abortions.  Her past medical and surgical histories are unremarkable  "otherwise.  Simin denies tobacco, alcohol, marijuana, and illicit drug use during the pregnancy.  Her mom has diabetes.  The family medical history is otherwise negative with respect of first-degree relatives with diabetes, hypertension, venous thromboembolism, or preeclampsia.  The family genetic history is negative with respect to genetic abnormalities, birth defects, and mental retardation.    Her Antepartum course is uncomplicated.    Past Medical History   OB History    Para Term  AB Living   3    2 0   SAB IAB Ectopic Multiple Live Births   0 2 0        # Outcome Date GA Lbr Bennett/2nd Weight Sex Type Anes PTL Lv   3 Current            2 IAB  5w0d          1 IAB  5w0d            Past Medical History[1]  Past Surgical History[2]        Current Medications[3]  Allergies: No Known Allergies      Objective   LMP 2025      Patient's last menstrual period was 2025.  Estimated Delivery Date: 2026, by Ultrasound    Physical Exam  General appearance: alert, well appearing, and in no distress and oriented to person, place, and time.  The remainder of her physical examination was deferred as she was here today for consultation and discussion.    Please refer to \"Imaging\" for ultrasound report from today's visit.              [1]   Past Medical History:  Diagnosis Date    Abnormal Pap smear of cervix     HPv 2024, colpo    HPV (human papilloma virus) infection     Varicella     as a child   [2] No past surgical history on file.  [3]   Current Outpatient Medications:     miconazole (MONISTAT 7) 100 mg vaginal suppository, Insert 1 suppository (100 mg total) into the vagina daily at bedtime, Disp: 7 suppository, Rfl: 0    Prenatal MV-Min-Fe Fum-FA-DHA (PRENATAL 1 PO), Take 1 tablet by mouth in the morning, Disp: , Rfl:     "

## 2025-07-19 ENCOUNTER — ROUTINE PRENATAL (OUTPATIENT)
Facility: HOSPITAL | Age: 40
End: 2025-07-19
Payer: COMMERCIAL

## 2025-07-19 ENCOUNTER — ANCILLARY PROCEDURE (OUTPATIENT)
Facility: HOSPITAL | Age: 40
End: 2025-07-19
Payer: COMMERCIAL

## 2025-07-19 VITALS
DIASTOLIC BLOOD PRESSURE: 52 MMHG | SYSTOLIC BLOOD PRESSURE: 92 MMHG | BODY MASS INDEX: 24.08 KG/M2 | HEIGHT: 67 IN | HEART RATE: 70 BPM | WEIGHT: 153.4 LBS

## 2025-07-19 DIAGNOSIS — O09.521 ELDERLY MULTIGRAVIDA, FIRST TRIMESTER: Primary | ICD-10-CM

## 2025-07-19 DIAGNOSIS — Z32.01 PREGNANCY, CONFIRMED, NOT FIRST: ICD-10-CM

## 2025-07-19 DIAGNOSIS — Z36.0 ENCOUNTER FOR ANTENATAL SCREENING FOR CHROMOSOMAL ANOMALIES: ICD-10-CM

## 2025-07-19 DIAGNOSIS — Z3A.13 13 WEEKS GESTATION OF PREGNANCY: ICD-10-CM

## 2025-07-19 PROCEDURE — 76801 OB US < 14 WKS SINGLE FETUS: CPT | Performed by: OBSTETRICS & GYNECOLOGY

## 2025-07-19 PROCEDURE — 76813 OB US NUCHAL MEAS 1 GEST: CPT | Performed by: OBSTETRICS & GYNECOLOGY

## 2025-07-19 PROCEDURE — NC001 PR NO CHARGE: Performed by: OBSTETRICS & GYNECOLOGY

## 2025-07-19 PROCEDURE — 36415 COLL VENOUS BLD VENIPUNCTURE: CPT | Performed by: OBSTETRICS & GYNECOLOGY

## 2025-07-19 PROCEDURE — 99203 OFFICE O/P NEW LOW 30 MIN: CPT | Performed by: OBSTETRICS & GYNECOLOGY

## 2025-07-19 NOTE — LETTER
2025     Ruth Rodrigez DO  [address]  [address]  [place] [address]    Patient: Simin York   YOB: 1985   Date of Visit: 2025       Dear Dr. Ruth Rodrigez DO:    Thank you for referring Simin York to me for evaluation. Below are my notes for this consultation.    If you have questions, please do not hesitate to call me. I look forward to following your patient along with you.         Sincerely,        Edy Collado MD        CC: No Recipients    Edy Collado MD  2025  7:41 AM  Incomplete  CONSULTATION: MATERNAL-FETAL MEDICINE  Name: Simin York      : 1985      MRN: [id number]  Encounter Provider:  US Dejah BARAJAS  Encounter Date: 2025   Encounter department: [facility]  :  Assessment & Plan  13 weeks gestation of pregnancy       Increased risk for preeclampsia   - Age   - History of nulliparity   - Recommend daily low-dose aspirin preeclampsia prophylaxis; continue until 36 weeks gestation  Elderly multigravida, first trimester       Normal late first trimester MFM nuchal translucency and detailed fetal anatomy evaluation    Today's ultrasound findings and suggested follow-up were discussed in detail with Simin.  At age 40 at her estimated due date, her risk for a liveborn baby at term with Down syndrome is 1 in 98, with a risk for all chromosomal abnormalities of 1 in 63.  We discussed that definitive prenatal diagnosis is possible only with genetic amniocentesis or chorionic villus sampling and discussed the small procedure related risk for pregnancy loss in each case.  We discussed the option of genetic screening using cell free DNA analysis which is not diagnostic, but which has sensitivities for identification of Down syndrome, trisomy 13, trisomy 18, and sex chromosome aneuploidies of 99.9%.   Simin opted for genetic screening using cell free DNA analysis.  MSAFP screening is recommended between  16 and 20 weeks gestation.  She will return for detailed MFM fetal anatomy evaluation at about 20 weeks gestation.    History of Present Illness {?Quick Links Encounters * My Last Note * Last Note in Specialty * Snapshot * Since Last Visit * History :23109}    Simin is a 39 y.o.  at 13w1d presenting for consultation for advanced maternal age.  With the exception of bloating and fatigue, she has no complaints.  She denies nausea and vomiting and vaginal bleeding.  A prenatal office note of  was reviewed prior to the MFM encounter.    Simin has a history of 2 therapeutic abortions.  Her past medical and surgical histories are unremarkable otherwise.  Simin denies tobacco, alcohol, marijuana, and illicit drug use during the pregnancy.  Her mom has diabetes.  The family medical history is otherwise negative with respect of first-degree relatives with diabetes, hypertension, venous thromboembolism, or preeclampsia.  The family genetic history is negative with respect to genetic abnormalities, birth defects, and mental retardation.    Her Antepartum course is uncomplicated.    Past Medical History   OB History    Para Term  AB Living   3    2 0   SAB IAB Ectopic Multiple Live Births   0 2 0        # Outcome Date GA Lbr Bennett/2nd Weight Sex Type Anes PTL Lv   3 Current            2 IAB  5w0d          1 IAB  5w0d            Past Medical History[1]  Past Surgical History[2]    Social History:   She denies current use of alcohol, drugs of abuse, tobacco, and marijuana products.   Occupation: ***  Partner: ***    Family History:  Family history was reviewed using an office screening tool, and is negative for congenital anomalies, genetic diseases, and thromboembolism in first degree relatives of this pregnancy. Family history is notable for ***.    Current Medications[3]  Allergies: No Known Allergies      Objective {?Quick Links Trend Vitals * Enter New Vitals * Results Review * Timeline  "(Adult) * Labs * Imaging * Cardiology * Procedures * Lung Cancer Screening * Surgical eConsent :49628}  LMP 2025      Patient's last menstrual period was 2025.  Estimated Delivery Date: 2026, by Ultrasound    Physical Exam  General appearance: alert, well appearing, and in no distress and oriented to person, place, and time.  The remainder of her physical examination was deferred as she was here today for consultation and discussion.    Please refer to \"Imaging\" for ultrasound report from today's visit.                  [1]  Past Medical History:  Diagnosis Date   • Abnormal Pap smear of cervix     HPv 2024, colpo   • HPV (human papilloma virus) infection    • Varicella     as a child   [2]  No past surgical history on file.  [3]    Current Outpatient Medications:   •  miconazole (MONISTAT 7) 100 mg vaginal suppository, Insert 1 suppository (100 mg total) into the vagina daily at bedtime, Disp: 7 suppository, Rfl: 0  •  Prenatal MV-Min-Fe Fum-FA-DHA (PRENATAL 1 PO), Take 1 tablet by mouth in the morning, Disp: , Rfl:     Edy Collado MD  2025  7:40 AM  Sign when Signing Visit  CONSULTATION: MATERNAL-FETAL MEDICINE  Name: Simin York      : 1985      MRN: [id number]  Encounter Provider:  US Dejah BARAJAS  Encounter Date: 2025   Encounter department: [facility]  :  Assessment & Plan  13 weeks gestation of pregnancy       Increased risk for preeclampsia   - Age   - History of nulliparity  Elderly multigravida, first trimester       Normal late first trimester MFM nuchal translucency and detailed fetal anatomy evaluation    Today's ultrasound findings and suggested follow-up were discussed in detail with Simin.  At age 40 at her estimated due date, her risk for a liveborn baby at term with Down syndrome is 1 in 98, with a risk for all chromosomal abnormalities of 1 in 63.  We discussed that definitive prenatal diagnosis is possible only with " genetic amniocentesis or chorionic villus sampling and discussed the small procedure related risk for pregnancy loss in each case.  We discussed the option of genetic screening using cell free DNA analysis which is not diagnostic, but which has sensitivities for identification of Down syndrome, trisomy 13, trisomy 18, and sex chromosome aneuploidies of 99.9%.    opted for genetic screening using cell free DNA analysis.  MSAFP screening is recommended between 16 and 20 weeks gestation.  She will return for detailed MFM fetal anatomy evaluation at about 20 weeks gestation.    History of Present Illness {?Quick Links Encounters * My Last Note * Last Note in Specialty * Snapshot * Since Last Visit * History :94082}    Simin is a 39 y.o.  at 13w1d presenting for consultation for advanced maternal age.  With the exception of bloating and fatigue, she has no complaints.  She denies nausea and vomiting and vaginal bleeding.  A prenatal office note of  was reviewed prior to the MFM encounter.    Simin has a history of 2 therapeutic abortions.  Her past medical and surgical histories are unremarkable otherwise.  Simin denies tobacco, alcohol, marijuana, and illicit drug use during the pregnancy.  Her mom has diabetes.  The family medical history is otherwise negative with respect of first-degree relatives with diabetes, hypertension, venous thromboembolism, or preeclampsia.  The family genetic history is negative with respect to genetic abnormalities, birth defects, and mental retardation.    Her Antepartum course is uncomplicated.    Past Medical History   OB History    Para Term  AB Living   3    2 0   SAB IAB Ectopic Multiple Live Births   0 2 0        # Outcome Date GA Lbr Bennett/2nd Weight Sex Type Anes PTL Lv   3 Current            2 IAB  5w0d          1 IAB  5w0d            Past Medical History[1]  Past Surgical History[2]    Social History:   She denies current use of alcohol,  "drugs of abuse, tobacco, and marijuana products.   Occupation: ***  Partner: ***    Family History:  Family history was reviewed using an office screening tool, and is negative for congenital anomalies, genetic diseases, and thromboembolism in first degree relatives of this pregnancy. Family history is notable for ***.    Current Medications[3]  Allergies: No Known Allergies      Objective {?Quick Links Trend Vitals * Enter New Vitals * Results Review * Timeline (Adult) * Labs * Imaging * Cardiology * Procedures * Lung Cancer Screening * Surgical eConsent :69378}  LMP 04/24/2025      Patient's last menstrual period was 04/24/2025.  Estimated Delivery Date: 1/22/2026, by Ultrasound    Physical Exam  General appearance: alert, well appearing, and in no distress and oriented to person, place, and time.  The remainder of her physical examination was deferred as she was here today for consultation and discussion.    Please refer to \"Imaging\" for ultrasound report from today's visit.              [1]   Past Medical History:  Diagnosis Date   • Abnormal Pap smear of cervix     HPv 07/16/2024, colpo   • HPV (human papilloma virus) infection    • Varicella     as a child   [2] No past surgical history on file.  [3]   Current Outpatient Medications:   •  miconazole (MONISTAT 7) 100 mg vaginal suppository, Insert 1 suppository (100 mg total) into the vagina daily at bedtime, Disp: 7 suppository, Rfl: 0  •  Prenatal MV-Min-Fe Fum-FA-DHA (PRENATAL 1 PO), Take 1 tablet by mouth in the morning, Disp: , Rfl:        "

## 2025-07-19 NOTE — PROGRESS NOTES
Patient chose to have [facility] KptotobX85 Non-Invasive Prenatal Screen [id number] PLUS w/ SCA, WITH fetal sex.  Patient choose to be billed through insurance.     Patient given brochure and is aware [facility] will contact patient's insurance and coordinate coverage.  Provided [facility] contact information. General inquiries [phone], Cost estimates [phone] and [facility] Billing [phone]. Website [email or web address].     Blood collection tubes labeled with patient identifiers (name, medical record number, and date of birth).     Filled out [facility] order form. Patient chose to have blood drawn in our office at time of visit. NIPS was drawn from right arm with a butterfly needle by De ODEN RN. .        Maternal Fetal Medicine will have results in approximately 5-7 business days and will call patient or notify via Fara.  Patient aware viewing lab result online will reveal fetal sex if ordered.    Patient verbalized understanding of all instructions and no questions at this time.

## 2025-07-19 NOTE — LETTER
2025     Christel Mattson MD  [address]  [address]  [facility] [address]    Patient: Simin York   YOB: 1985   Date of Visit: 2025       Dear Dr. Christel Mattson MD:    Thank you for referring Simin York to me for evaluation. Below are my notes for this consultation.    If you have questions, please do not hesitate to call me. I look forward to following your patient along with you.         Sincerely,        Edy Collado MD        CC: No Recipients    Edy Collado MD  2025  7:42 AM  Sign when Signing Visit  CONSULTATION: MATERNAL-FETAL MEDICINE  Name: Simin York      : 1985      MRN: [id number]  Encounter Provider:  US Dejah BARAJAS  Encounter Date: 2025   Encounter department: [facility]  :  Assessment & Plan  13 weeks gestation of pregnancy       Increased risk for preeclampsia   - Age   - History of nulliparity   - Recommend daily low-dose aspirin preeclampsia prophylaxis; continue until 36 weeks gestation  Elderly multigravida, first trimester       Normal late first trimester MFM nuchal translucency and detailed fetal anatomy evaluation    Today's ultrasound findings and suggested follow-up were discussed in detail with Simin.  At age 40 at her estimated due date, her risk for a liveborn baby at term with Down syndrome is 1 in 98, with a risk for all chromosomal abnormalities of 1 in 63.  We discussed that definitive prenatal diagnosis is possible only with genetic amniocentesis or chorionic villus sampling and discussed the small procedure related risk for pregnancy loss in each case.  We discussed the option of genetic screening using cell free DNA analysis which is not diagnostic, but which has sensitivities for identification of Down syndrome, trisomy 13, trisomy 18, and sex chromosome aneuploidies of 99.9%.   Simin opted for genetic screening using cell free DNA analysis.  MSAFP screening  "is recommended between 16 and 20 weeks gestation.  She will return for detailed MFM fetal anatomy evaluation at about 20 weeks gestation.    History of Present Illness     Simin is a 39 y.o.  at 13w1d presenting for consultation for advanced maternal age.  With the exception of bloating and fatigue, she has no complaints.  She denies nausea and vomiting and vaginal bleeding.  A prenatal office note of  was reviewed prior to the MFM encounter.    Simin has a history of 2 therapeutic abortions.  Her past medical and surgical histories are unremarkable otherwise.  Simin denies tobacco, alcohol, marijuana, and illicit drug use during the pregnancy.  Her mom has diabetes.  The family medical history is otherwise negative with respect of first-degree relatives with diabetes, hypertension, venous thromboembolism, or preeclampsia.  The family genetic history is negative with respect to genetic abnormalities, birth defects, and mental retardation.    Her Antepartum course is uncomplicated.    Past Medical History   OB History    Para Term  AB Living   3    2 0   SAB IAB Ectopic Multiple Live Births   0 2 0        # Outcome Date GA Lbr Bennett/2nd Weight Sex Type Anes PTL Lv   3 Current            2 IAB  5w0d          1 IAB  5w0d            Past Medical History[1]  Past Surgical History[2]        Current Medications[3]  Allergies: No Known Allergies      Objective   LMP 2025      Patient's last menstrual period was 2025.  Estimated Delivery Date: 2026, by Ultrasound    Physical Exam  General appearance: alert, well appearing, and in no distress and oriented to person, place, and time.  The remainder of her physical examination was deferred as she was here today for consultation and discussion.    Please refer to \"Imaging\" for ultrasound report from today's visit.              [1]   Past Medical History:  Diagnosis Date   • Abnormal Pap smear of cervix     HPv 2024, " colpo   • HPV (human papilloma virus) infection    • Varicella     as a child   [2] No past surgical history on file.  [3]   Current Outpatient Medications:   •  miconazole (MONISTAT 7) 100 mg vaginal suppository, Insert 1 suppository (100 mg total) into the vagina daily at bedtime, Disp: 7 suppository, Rfl: 0  •  Prenatal MV-Min-Fe Fum-FA-DHA (PRENATAL 1 PO), Take 1 tablet by mouth in the morning, Disp: , Rfl:

## 2025-07-21 RX ORDER — ASPIRIN 81 MG/1
162 TABLET, CHEWABLE ORAL
Qty: 180 TABLET | Refills: 1 | Status: SHIPPED | OUTPATIENT
Start: 2025-07-21 | End: 2025-10-19

## 2025-07-25 LAB
CFDNA.FET/CFDNA.TOTAL SFR FETUS: NORMAL %
CFDNA.FET/CFDNA.TOTAL SFR FETUS: NORMAL %
CITATION REF LAB TEST: NORMAL
FET 13+18+21+X+Y ANEUP PLAS.CFDNA: NEGATIVE
FET CHR 21 TS PLAS.CFDNA QL: NEGATIVE
FET CHR 21 TS PLAS.CFDNA QL: NEGATIVE
FET MS X RISK WBC.DNA+CFDNA QL: NOT DETECTED
FET SEX PLAS.CFDNA DOSAGE CFDNA: NORMAL
FET TS 13 RISK PLAS.CFDNA QL: NEGATIVE
FET X + Y ANEUP RISK PLAS.CFDNA SEQ-IMP: NOT DETECTED
GA EST FROM CONCEPTION DATE: NORMAL D
GESTATIONAL AGE > 9:: YES
LAB DIRECTOR NAME PROVIDER: NORMAL
LABORATORY COMMENT REPORT: NORMAL
LIMITATIONS OF THE TEST: NORMAL
NEGATIVE PREDICTIVE VALUE: NORMAL
PERFORMANCE CHARACTERISTICS: NORMAL
POSITIVE PREDICTIVE VALUE: NORMAL
REF LAB TEST METHOD: NORMAL
SL AMB NOTE:: NORMAL
TEST PERFORMANCE INFO SPEC: NORMAL

## 2025-08-01 ENCOUNTER — INITIAL PRENATAL (OUTPATIENT)
Dept: OBGYN CLINIC | Facility: CLINIC | Age: 40
End: 2025-08-01

## 2025-08-01 VITALS — WEIGHT: 155 LBS | SYSTOLIC BLOOD PRESSURE: 94 MMHG | BODY MASS INDEX: 24.28 KG/M2 | DIASTOLIC BLOOD PRESSURE: 58 MMHG

## 2025-08-01 DIAGNOSIS — O09.521 ELDERLY MULTIGRAVIDA, FIRST TRIMESTER: ICD-10-CM

## 2025-08-01 DIAGNOSIS — Z3A.15 15 WEEKS GESTATION OF PREGNANCY: Primary | ICD-10-CM

## 2025-08-01 PROBLEM — O09.522 MULTIGRAVIDA OF ADVANCED MATERNAL AGE IN SECOND TRIMESTER: Status: ACTIVE | Noted: 2025-08-01

## 2025-08-01 PROCEDURE — G0145 SCR C/V CYTO,THINLAYER,RESCR: HCPCS | Performed by: OBSTETRICS & GYNECOLOGY

## 2025-08-01 PROCEDURE — PNV: Performed by: OBSTETRICS & GYNECOLOGY

## 2025-08-01 PROCEDURE — G0476 HPV COMBO ASSAY CA SCREEN: HCPCS | Performed by: OBSTETRICS & GYNECOLOGY

## 2025-08-01 PROCEDURE — 87591 N.GONORRHOEAE DNA AMP PROB: CPT | Performed by: OBSTETRICS & GYNECOLOGY

## 2025-08-01 PROCEDURE — 87491 CHLMYD TRACH DNA AMP PROBE: CPT | Performed by: OBSTETRICS & GYNECOLOGY

## 2025-08-06 LAB
C TRACH DNA SPEC QL NAA+PROBE: NEGATIVE
N GONORRHOEA DNA SPEC QL NAA+PROBE: NEGATIVE

## 2025-08-07 ENCOUNTER — APPOINTMENT (OUTPATIENT)
Dept: LAB | Facility: AMBULARY SURGERY CENTER | Age: 40
End: 2025-08-07
Payer: COMMERCIAL

## 2025-08-08 LAB
LAB AP GYN PRIMARY INTERPRETATION: NORMAL
LAB AP LMP: NORMAL
Lab: NORMAL